# Patient Record
Sex: MALE | Race: WHITE
[De-identification: names, ages, dates, MRNs, and addresses within clinical notes are randomized per-mention and may not be internally consistent; named-entity substitution may affect disease eponyms.]

---

## 2018-03-11 ENCOUNTER — HOSPITAL ENCOUNTER (EMERGENCY)
Dept: HOSPITAL 62 - ER | Age: 38
Discharge: HOME | End: 2018-03-11
Payer: COMMERCIAL

## 2018-03-11 VITALS — DIASTOLIC BLOOD PRESSURE: 113 MMHG | SYSTOLIC BLOOD PRESSURE: 169 MMHG

## 2018-03-11 DIAGNOSIS — R11.2: Primary | ICD-10-CM

## 2018-03-11 DIAGNOSIS — K92.1: ICD-10-CM

## 2018-03-11 DIAGNOSIS — R10.9: ICD-10-CM

## 2018-03-11 DIAGNOSIS — F17.200: ICD-10-CM

## 2018-03-11 LAB
ADD MANUAL DIFF: NO
ALBUMIN SERPL-MCNC: 5.1 G/DL (ref 3.5–5)
ALP SERPL-CCNC: 91 U/L (ref 38–126)
ALT SERPL-CCNC: 67 U/L (ref 21–72)
ANION GAP SERPL CALC-SCNC: 15 MMOL/L (ref 5–19)
AST SERPL-CCNC: 44 U/L (ref 17–59)
BASOPHILS # BLD AUTO: 0.1 10^3/UL (ref 0–0.2)
BASOPHILS NFR BLD AUTO: 0.5 % (ref 0–2)
BILIRUB DIRECT SERPL-MCNC: 0.4 MG/DL (ref 0–0.4)
BILIRUB SERPL-MCNC: 1.2 MG/DL (ref 0.2–1.3)
BUN SERPL-MCNC: 10 MG/DL (ref 7–20)
CALCIUM: 10.4 MG/DL (ref 8.4–10.2)
CHLORIDE SERPL-SCNC: 99 MMOL/L (ref 98–107)
CO2 SERPL-SCNC: 27 MMOL/L (ref 22–30)
EOSINOPHIL # BLD AUTO: 0.1 10^3/UL (ref 0–0.6)
EOSINOPHIL NFR BLD AUTO: 0.4 % (ref 0–6)
ERYTHROCYTE [DISTWIDTH] IN BLOOD BY AUTOMATED COUNT: 12.6 % (ref 11.5–14)
ETHANOL SERPL-MCNC: < 10 MG/DL
GLUCOSE SERPL-MCNC: 118 MG/DL (ref 75–110)
HCT VFR BLD CALC: 50.7 % (ref 37.9–51)
HGB BLD-MCNC: 18 G/DL (ref 13.5–17)
LIPASE SERPL-CCNC: 160.9 U/L (ref 23–300)
LYMPHOCYTES # BLD AUTO: 1.8 10^3/UL (ref 0.5–4.7)
LYMPHOCYTES NFR BLD AUTO: 13.3 % (ref 13–45)
MCH RBC QN AUTO: 31.4 PG (ref 27–33.4)
MCHC RBC AUTO-ENTMCNC: 35.6 G/DL (ref 32–36)
MCV RBC AUTO: 88 FL (ref 80–97)
MONOCYTES # BLD AUTO: 0.8 10^3/UL (ref 0.1–1.4)
MONOCYTES NFR BLD AUTO: 6.2 % (ref 3–13)
NEUTROPHILS # BLD AUTO: 10.6 10^3/UL (ref 1.7–8.2)
NEUTS SEG NFR BLD AUTO: 79.6 % (ref 42–78)
PLATELET # BLD: 385 10^3/UL (ref 150–450)
POTASSIUM SERPL-SCNC: 3.8 MMOL/L (ref 3.6–5)
PROT SERPL-MCNC: 7.8 G/DL (ref 6.3–8.2)
RBC # BLD AUTO: 5.75 10^6/UL (ref 4.35–5.55)
SODIUM SERPL-SCNC: 140.5 MMOL/L (ref 137–145)
TOTAL CELLS COUNTED % (AUTO): 100 %
WBC # BLD AUTO: 13.3 10^3/UL (ref 4–10.5)

## 2018-03-11 PROCEDURE — 99284 EMERGENCY DEPT VISIT MOD MDM: CPT

## 2018-03-11 PROCEDURE — 80307 DRUG TEST PRSMV CHEM ANLYZR: CPT

## 2018-03-11 PROCEDURE — 80053 COMPREHEN METABOLIC PANEL: CPT

## 2018-03-11 PROCEDURE — 83690 ASSAY OF LIPASE: CPT

## 2018-03-11 PROCEDURE — 96361 HYDRATE IV INFUSION ADD-ON: CPT

## 2018-03-11 PROCEDURE — 96374 THER/PROPH/DIAG INJ IV PUSH: CPT

## 2018-03-11 PROCEDURE — 85025 COMPLETE CBC W/AUTO DIFF WBC: CPT

## 2018-03-11 PROCEDURE — S0164 INJECTION PANTROPRAZOLE: HCPCS

## 2018-03-11 PROCEDURE — 83735 ASSAY OF MAGNESIUM: CPT

## 2018-03-11 PROCEDURE — 96375 TX/PRO/DX INJ NEW DRUG ADDON: CPT

## 2018-03-11 PROCEDURE — 36415 COLL VENOUS BLD VENIPUNCTURE: CPT

## 2018-03-11 NOTE — ER DOCUMENT REPORT
ED General





- General


Chief Complaint: Abdominal Pain


Stated Complaint: VOMITING BLOOD, BLOOD IN STOOL


Time Seen by Provider: 03/11/18 09:00


TRAVEL OUTSIDE OF THE [place] IN LAST 30 DAYS: No





- HPI


Patient complains to provider of: Nausea vomiting


Notes: 





Patient presents for nausea vomiting ongoing for approximately 10 days.  

Patient states he had 2 days of bloody stools that stopped 5 days ago now is 

vomiting a slight amount of blood.  Patient states he does drink alcohol 

approximately 3-4 drinks daily.  Patient denies ever having a colonoscopy or 

EGD performed.  Patient states small streaks whenever he does vomit.  Patient 

denies any fevers chills diarrhea.  Patient resting comfortably upon my 

evaluation.  Patient also does admit smoking and slight marijuana use.  No 

recent travel or antibiotics.





- Related Data


Allergies/Adverse Reactions: 


 





No Known Allergies Allergy (Unverified 03/11/18 08:43)


 











Past Medical History





- Social History


Smoking Status: Current Every Day Smoker


Chew tobacco use (# tins/day): No


Frequency of alcohol use: None


Drug Abuse: None


Family History: Reviewed & Not Pertinent


Patient has suicidal ideation: No


Patient has homicidal ideation: No


Renal/ Medical History: Denies: Hx Peritoneal Dialysis





Review of Systems





- Review of Systems


Constitutional: No symptoms reported


EENT: No symptoms reported


Cardiovascular: No symptoms reported


Respiratory: No symptoms reported


Gastrointestinal: Nausea, Vomiting


Genitourinary: No symptoms reported


Male Genitourinary: No symptoms reported


Musculoskeletal: No symptoms reported


Skin: No symptoms reported


Hematologic/Lymphatic: No symptoms reported


Neurological/Psychological: No symptoms reported


-: Yes All other systems reviewed and negative





Physical Exam





- Vital signs


Vitals: 


 











Temp Pulse Resp BP Pulse Ox


 


 97.9 F   97   16   165/109 H  98 


 


 03/11/18 08:49  03/11/18 08:49  03/11/18 08:49  03/11/18 08:49  03/11/18 08:49











Interpretation: Normal





- General


General appearance: Appears well, Alert





- HEENT


Head: Normocephalic, Atraumatic


Eyes: Normal


Pupils: PERRL





- Respiratory


Respiratory status: No respiratory distress


Chest status: Nontender


Breath sounds: Normal


Chest palpation: Normal





- Cardiovascular


Rhythm: Regular


Heart sounds: Normal auscultation


Murmur: No





- Abdominal


Inspection: Normal


Distension: No distension


Bowel sounds: Normal


Tenderness: Nontender


Organomegaly: No organomegaly





- Back


Back: Normal, Nontender





- Extremities


General upper extremity: Normal inspection, Nontender, Normal color, Normal ROM

, Normal temperature


General lower extremity: Normal inspection, Nontender, Normal color, Normal ROM

, Normal temperature, Normal weight bearing.  No: Eliu's sign





- Neurological


Neuro grossly intact: Yes


Cognition: Normal


Orientation: AAOx4


Clark Coma Scale Eye Opening: Spontaneous


Roland Coma Scale Verbal: Oriented


Clark Coma Scale Motor: Obeys Commands


Clark Coma Scale Total: 15


Speech: Normal


Motor strength normal: LUE, RUE, LLE, RLE


Sensory: Normal





- Psychological


Associated symptoms: Normal affect, Normal mood





- Skin


Skin Temperature: Warm


Skin Moisture: Dry


Skin Color: Normal





Course





- Re-evaluation


Re-evalutation: 





03/11/18 15:07


Laboratory studies showed hemoconcentration.  Patient had no vomiting here is 

able tolerate p.o.  More likely underlying gastritis.  Patient was encouraged 

to stop drinking alcohol patient agrees with plan with nausea medication and 

omeprazole.  Patient scheduled for his care discharged home.  The patient 

presents with nausea vomiting without signs of peritonitis or other life-

threatening or serious etiology. The patient appears stable for discharge and 

has been instructed to return immediately if the symptoms worsen in any way, or 

in 8-12hr if not improved for re-evaluation. The patient has been instructed to 

return if the symptoms worsen or change in any way.





- Vital Signs


Vital signs: 


 











Temp Pulse Resp BP Pulse Ox


 


 97.9 F   92   18   169/113 H  98 


 


 03/11/18 08:49  03/11/18 11:34  03/11/18 11:34  03/11/18 11:34  03/11/18 11:34














- Laboratory


Result Diagrams: 


 03/11/18 09:03





 03/11/18 09:03


Laboratory results interpreted by me: 


 











  03/11/18 03/11/18





  09:03 09:03


 


WBC  13.3 H 


 


RBC  5.75 H 


 


Hgb  18.0 H 


 


Seg Neutrophils %  79.6 H 


 


Absolute Neutrophils  10.6 H 


 


Glucose   118 H


 


Calcium   10.4 H


 


Albumin   5.1 H














Discharge





- Discharge


Clinical Impression: 


 Nausea & vomiting





Condition: Good


Disposition: HOME, SELF-CARE


Instructions:  Gastritis ([facility]), Gastroenteritis (adult) ([facility]), Gastroenterology


Additional Instructions: 


Your laboratory results today show signs of dehydration.  More likely have a GI 

virus that is causing nausea vomiting and some inflammation of the stomach 

gastritis causing the small amount of blood to be vomited.  Please abstain from 

any alcohol use for the next few days.  Take medications as prescribed.  Return 

to ER symptoms worsen.  Would recommend following up with her primary care 

physician and/or gastroenterologist


Prescriptions: 


Omeprazole 20 mg PO DAILY #30 capsule.


Ondansetron [Zofran Odt] 4 mg PO Q6 PRN #30 tab.rapdis


 PRN Reason: For Nausea/Vomiting


Promethazine HCl [Phenergan 25 mg Tablet] 25 mg PO Q6 #30 tablet


Referrals: 


REA VILLATORO DO [Primary Care Provider] - Follow up as needed

## 2018-05-12 ENCOUNTER — HOSPITAL ENCOUNTER (EMERGENCY)
Dept: HOSPITAL 62 - ER | Age: 38
Discharge: HOME | End: 2018-05-12
Payer: COMMERCIAL

## 2018-05-12 VITALS — SYSTOLIC BLOOD PRESSURE: 164 MMHG | DIASTOLIC BLOOD PRESSURE: 119 MMHG

## 2018-05-12 DIAGNOSIS — F17.200: ICD-10-CM

## 2018-05-12 DIAGNOSIS — K21.9: Primary | ICD-10-CM

## 2018-05-12 DIAGNOSIS — I10: ICD-10-CM

## 2018-05-12 DIAGNOSIS — R11.2: ICD-10-CM

## 2018-05-12 DIAGNOSIS — Z79.899: ICD-10-CM

## 2018-05-12 DIAGNOSIS — K29.70: ICD-10-CM

## 2018-05-12 LAB
ADD MANUAL DIFF: NO
ALBUMIN SERPL-MCNC: 5.3 G/DL (ref 3.5–5)
ALP SERPL-CCNC: 98 U/L (ref 38–126)
ALT SERPL-CCNC: 56 U/L (ref 21–72)
ANION GAP SERPL CALC-SCNC: 16 MMOL/L (ref 5–19)
AST SERPL-CCNC: 39 U/L (ref 17–59)
BASOPHILS # BLD AUTO: 0.1 10^3/UL (ref 0–0.2)
BASOPHILS NFR BLD AUTO: 0.5 % (ref 0–2)
BILIRUB DIRECT SERPL-MCNC: 0.3 MG/DL (ref 0–0.4)
BILIRUB SERPL-MCNC: 0.9 MG/DL (ref 0.2–1.3)
BUN SERPL-MCNC: 13 MG/DL (ref 7–20)
CALCIUM: 10.5 MG/DL (ref 8.4–10.2)
CHLORIDE SERPL-SCNC: 98 MMOL/L (ref 98–107)
CO2 SERPL-SCNC: 28 MMOL/L (ref 22–30)
EOSINOPHIL # BLD AUTO: 0 10^3/UL (ref 0–0.6)
EOSINOPHIL NFR BLD AUTO: 0.3 % (ref 0–6)
ERYTHROCYTE [DISTWIDTH] IN BLOOD BY AUTOMATED COUNT: 12.8 % (ref 11.5–14)
GLUCOSE SERPL-MCNC: 140 MG/DL (ref 75–110)
HCT VFR BLD CALC: 52 % (ref 37.9–51)
HGB BLD-MCNC: 18.2 G/DL (ref 13.5–17)
LIPASE SERPL-CCNC: 214.1 U/L (ref 23–300)
LYMPHOCYTES # BLD AUTO: 1.4 10^3/UL (ref 0.5–4.7)
LYMPHOCYTES NFR BLD AUTO: 13.1 % (ref 13–45)
MCH RBC QN AUTO: 30.6 PG (ref 27–33.4)
MCHC RBC AUTO-ENTMCNC: 35 G/DL (ref 32–36)
MCV RBC AUTO: 88 FL (ref 80–97)
MONOCYTES # BLD AUTO: 0.6 10^3/UL (ref 0.1–1.4)
MONOCYTES NFR BLD AUTO: 5.5 % (ref 3–13)
NEUTROPHILS # BLD AUTO: 8.5 10^3/UL (ref 1.7–8.2)
NEUTS SEG NFR BLD AUTO: 80.6 % (ref 42–78)
PLATELET # BLD: 367 10^3/UL (ref 150–450)
POTASSIUM SERPL-SCNC: 4 MMOL/L (ref 3.6–5)
PROT SERPL-MCNC: 8 G/DL (ref 6.3–8.2)
RBC # BLD AUTO: 5.94 10^6/UL (ref 4.35–5.55)
SODIUM SERPL-SCNC: 141.5 MMOL/L (ref 137–145)
TOTAL CELLS COUNTED % (AUTO): 100 %
WBC # BLD AUTO: 10.5 10^3/UL (ref 4–10.5)

## 2018-05-12 PROCEDURE — 80053 COMPREHEN METABOLIC PANEL: CPT

## 2018-05-12 PROCEDURE — 96375 TX/PRO/DX INJ NEW DRUG ADDON: CPT

## 2018-05-12 PROCEDURE — 96361 HYDRATE IV INFUSION ADD-ON: CPT

## 2018-05-12 PROCEDURE — 96374 THER/PROPH/DIAG INJ IV PUSH: CPT

## 2018-05-12 PROCEDURE — 85025 COMPLETE CBC W/AUTO DIFF WBC: CPT

## 2018-05-12 PROCEDURE — 99283 EMERGENCY DEPT VISIT LOW MDM: CPT

## 2018-05-12 PROCEDURE — 36415 COLL VENOUS BLD VENIPUNCTURE: CPT

## 2018-05-12 PROCEDURE — 83690 ASSAY OF LIPASE: CPT

## 2018-05-12 NOTE — ER DOCUMENT REPORT
ED General





- General


Chief Complaint: High Blood Pressure


Stated Complaint: BLOOD PRESSURE CONCERNS


Time Seen by Provider: 05/12/18 12:09


Notes: 





Patient is a 37-year-old male presents emergency room with a chief complaint of 

epigastric discomfort and inability to tolerate p.o.  Patient states his been 

going on since yesterday.  He admits to GERD with nausea and vomiting.  Patient 

states that he has had this similarly in the past and came here and was treated 

and discharged home.  Patient follows with [facility] NP Zoran 





Patient states that he takes lisinopril with HCTZ for blood pressure.


He admits that he was previously drinking a sixpack a day of beer but is 

recently started to cut down.


TRAVEL OUTSIDE OF THE [place] IN LAST 30 DAYS: No





- Related Data


Allergies/Adverse Reactions: 


 





No Known Allergies Allergy (Verified 05/12/18 12:04)


 











Past Medical History





- Social History


Smoking Status: Current Every Day Smoker


Chew tobacco use (# tins/day): No


Frequency of alcohol use: Heavy


Drug Abuse: None


Family History: Reviewed & Not Pertinent


Patient has suicidal ideation: No


Patient has homicidal ideation: No





- Past Medical History


Cardiac Medical History: Reports: Hx Hypertension


Renal/ Medical History: Denies: Hx Peritoneal Dialysis





Review of Systems





- Review of Systems


Constitutional: No symptoms reported


Cardiovascular: No symptoms reported


Respiratory: No symptoms reported


Gastrointestinal: See HPI


Genitourinary: No symptoms reported


Musculoskeletal: No symptoms reported


-: Yes All other systems reviewed and negative





Physical Exam





- Vital signs


Vitals: 


 











Temp Pulse Resp BP Pulse Ox


 


 97.6 F   80   20   171/127 H  100 


 


 05/12/18 12:01  05/12/18 12:01  05/12/18 12:01  05/12/18 12:01  05/12/18 12:01














- Notes


Notes: 





PHYSICAL EXAM


GENERAL: Alert, interacts well. 


HEAD: Normocephalic, atraumatic.


EYES: Pupils equal, round, and reactive to light. Extraocular movements intact.


ENT: Oral mucosa moist, tongue midline. 


NECK: Full range of motion. Supple. Trachea midline.


LUNGS: Clear to auscultation bilaterally, no wheezes, rales, or rhonchi. No 

respiratory distress.


HEART: Regular rate and rhythm. No murmurs, gallops, or rubs.


ABDOMEN: Soft,  nondistended, nontender. No guarding, rebound, or rigidity.. 

Bowel sounds present in all 4 quadrants.


EXTREMITIES: Moves all 4 extremities spontaneously. No edema, radial and 

dorsalis pedis pulses 2/4 bilaterally. No cyanosis. 


NEUROLOGICAL: Alert and oriented x4. Normal speech.


PSYCH: Normal affect, normal mood.


SKIN: Warm, dry, normal turgor. No rashes or lesions noted.


   





Course





- Re-evaluation


Re-evalutation: 





05/12/18 15:15


Patient is a 37-year-old male is hemodynamic stable, no acute distress 

afebrile.  Patient's it is complete resolution of his pain after GI cocktail 

that he received up in triage.  Patient denies any nausea or vomiting at this 

time.  Exam does not reveal any concerns for acute abdominal pathology.  

Patient without benign physical exam.  No concerns for pancreatitis, 

cholecystitis, peritonitis, appendicitis, mesenteric ischemia or obstruction.  

Patient to follow-up with primary care.





- Vital Signs


Vital signs: 


 











Temp Pulse Resp BP Pulse Ox


 


 97.6 F   94   18   175/117 H  100 


 


 05/12/18 12:01  05/12/18 13:45  05/12/18 13:45  05/12/18 13:45  05/12/18 13:45














- Laboratory


Result Diagrams: 


 05/12/18 12:37





 05/12/18 12:37


Laboratory results interpreted by me: 


 











  05/12/18 05/12/18





  12:37 12:37


 


RBC  5.94 H 


 


Hgb  18.2 H 


 


Hct  52.0 H 


 


Seg Neutrophils %  80.6 H 


 


Absolute Neutrophils  8.5 H 


 


Glucose   140 H


 


Calcium   10.5 H


 


Albumin   5.3 H














Discharge





- Discharge


Clinical Impression: 


 GERD (gastroesophageal reflux disease), Gastritis





Condition: Good


Disposition: HOME, SELF-CARE


Instructions:  Clear Liquid Diet ([facility])


Additional Instructions: 


Your symptoms appear to be most consistent with stomach or upper intestinal 

irritation.   Please begin taking famotidine 40 mg in the morning and 40 mg at 

night.  This medicine can be purchased directly over-the-counter.  You may also 

take medicine such as Pepto-Bismol or Tums to assist with your pain.  Please 

return to emergency department immediately if you have worsening of your pain, 

shortness of breath, vomiting, become unable to exert yourself due to pain or 

difficulty breathing, you pass out, or have any pain that radiates into your 

arms, jaw, or back. Please also return if you have any additional symptoms that 

are concerning to you.








You can buy Maalox over the counter which will coat your stomach





Prescriptions: 


Omeprazole Magnesium [Prilosec Otc] 20 mg PO DAILY #30 tablet.


Ondansetron [Zofran Odt 4 mg Tablet] 1 - 2 tab PO Q4H PRN #15 tab.rapdis


 PRN Reason: For Nausea/Vomiting


Sucralfate [Carafate 1 gm Tablet] 1 gm PO ACHS #120 tablet


Forms:  Elevated Blood Pressure


Referrals: 


MICKIE CLOUD FNP [Primary Care Provider] - Follow up in 1 week

## 2018-05-12 NOTE — ER DOCUMENT REPORT
ED Medical Screen (RME)





- General


Chief Complaint: High Blood Pressure


Stated Complaint: BLOOD PRESSURE CONCERNS


Time Seen by Provider: 05/12/18 12:09


Notes: 





RAPID MEDICAL EVALUATION DISCLOSURE


I have seen this patient as part of a Rapid Medical Evaluation and, if 

applicable, placed any initially appropriate orders. The patient will be seen 

and fully evaluated, including a full history and physical exam, by a provider (

in Main ED or Fast Track) when a room becomes available.





------------------------------------------------------------------





37-year-old male PMH EtOH abuse (6 pack beer daily for many years) here with 

complaints of nausea vomiting and "my stomach is on fire" for the past 2 days.  

He states he has been unable to keep down any foods liquids or medications.  He 

had the same constellation of symptoms recently and was seen in the emergency 

department.  He was prescribed Zofran ODT which he has been swallowing and "I 

am puking it right back up".  He is not allowing it to dissolve under his 

tongue.  He has not been able to keep down his lisinopril/hydrochlorothiazide 

combination blood pressure medication.  His usual blood pressure is in the 120s 

systolic.  He denies any chest pain shortness of breath headache.  He denies 

any prior history of pancreatitis.





EXAM


Clear to auscultation bilaterally


Regular rate and rhythm


No abdominal TTP on my exam


No peritoneal signs


TRAVEL OUTSIDE OF THE [place] IN LAST 30 DAYS: No





- Related Data


Allergies/Adverse Reactions: 


 





No Known Allergies Allergy (Verified 05/12/18 12:04)


 











Past Medical History





- Social History


Chew tobacco use (# tins/day): No


Frequency of alcohol use: Heavy


Drug Abuse: None





- Past Medical History


Cardiac Medical History: Reports: Hx Hypertension


Renal/ Medical History: Denies: Hx Peritoneal Dialysis





Physical Exam





- Vital signs


Vitals: 





 











Temp Pulse Resp BP Pulse Ox


 


 97.6 F   80   20   171/127 H  100 


 


 05/12/18 12:01  05/12/18 12:01  05/12/18 12:01  05/12/18 12:01  05/12/18 12:01














Course





- Vital Signs


Vital signs: 





 











Temp Pulse Resp BP Pulse Ox


 


 97.6 F   80   20   171/127 H  100 


 


 05/12/18 12:01  05/12/18 12:01  05/12/18 12:01  05/12/18 12:01  05/12/18 12:01

## 2018-06-11 ENCOUNTER — HOSPITAL ENCOUNTER (EMERGENCY)
Dept: HOSPITAL 62 - ER | Age: 38
Discharge: HOME | End: 2018-06-11
Payer: COMMERCIAL

## 2018-06-11 VITALS — SYSTOLIC BLOOD PRESSURE: 117 MMHG | DIASTOLIC BLOOD PRESSURE: 81 MMHG

## 2018-06-11 DIAGNOSIS — Z79.899: ICD-10-CM

## 2018-06-11 DIAGNOSIS — I10: ICD-10-CM

## 2018-06-11 DIAGNOSIS — K21.9: ICD-10-CM

## 2018-06-11 DIAGNOSIS — R11.2: ICD-10-CM

## 2018-06-11 DIAGNOSIS — R07.9: ICD-10-CM

## 2018-06-11 DIAGNOSIS — R10.13: Primary | ICD-10-CM

## 2018-06-11 DIAGNOSIS — F17.210: ICD-10-CM

## 2018-06-11 DIAGNOSIS — R42: ICD-10-CM

## 2018-06-11 DIAGNOSIS — R20.0: ICD-10-CM

## 2018-06-11 LAB
ADD MANUAL DIFF: NO
ALBUMIN SERPL-MCNC: 5 G/DL (ref 3.5–5)
ALP SERPL-CCNC: 80 U/L (ref 38–126)
ALT SERPL-CCNC: 40 U/L (ref 21–72)
ANION GAP SERPL CALC-SCNC: 16 MMOL/L (ref 5–19)
AST SERPL-CCNC: 26 U/L (ref 17–59)
BASOPHILS # BLD AUTO: 0 10^3/UL (ref 0–0.2)
BASOPHILS NFR BLD AUTO: 0.2 % (ref 0–2)
BILIRUB DIRECT SERPL-MCNC: 0.3 MG/DL (ref 0–0.4)
BILIRUB SERPL-MCNC: 1 MG/DL (ref 0.2–1.3)
BUN SERPL-MCNC: 14 MG/DL (ref 7–20)
CALCIUM: 10.3 MG/DL (ref 8.4–10.2)
CHLORIDE SERPL-SCNC: 92 MMOL/L (ref 98–107)
CK MB SERPL-MCNC: 1.1 NG/ML (ref ?–4.55)
CK SERPL-CCNC: 77 U/L (ref 55–170)
CO2 SERPL-SCNC: 27 MMOL/L (ref 22–30)
EOSINOPHIL # BLD AUTO: 0 10^3/UL (ref 0–0.6)
EOSINOPHIL NFR BLD AUTO: 0.2 % (ref 0–6)
ERYTHROCYTE [DISTWIDTH] IN BLOOD BY AUTOMATED COUNT: 12.7 % (ref 11.5–14)
GLUCOSE SERPL-MCNC: 116 MG/DL (ref 75–110)
HCT VFR BLD CALC: 48 % (ref 37.9–51)
HGB BLD-MCNC: 17.2 G/DL (ref 13.5–17)
LYMPHOCYTES # BLD AUTO: 1.8 10^3/UL (ref 0.5–4.7)
LYMPHOCYTES NFR BLD AUTO: 10.8 % (ref 13–45)
MCH RBC QN AUTO: 30.4 PG (ref 27–33.4)
MCHC RBC AUTO-ENTMCNC: 35.8 G/DL (ref 32–36)
MCV RBC AUTO: 85 FL (ref 80–97)
MONOCYTES # BLD AUTO: 0.8 10^3/UL (ref 0.1–1.4)
MONOCYTES NFR BLD AUTO: 4.7 % (ref 3–13)
NEUTROPHILS # BLD AUTO: 14 10^3/UL (ref 1.7–8.2)
NEUTS SEG NFR BLD AUTO: 84.1 % (ref 42–78)
PLATELET # BLD: 381 10^3/UL (ref 150–450)
POTASSIUM SERPL-SCNC: 3.9 MMOL/L (ref 3.6–5)
PROT SERPL-MCNC: 7.4 G/DL (ref 6.3–8.2)
RBC # BLD AUTO: 5.65 10^6/UL (ref 4.35–5.55)
SODIUM SERPL-SCNC: 134.6 MMOL/L (ref 137–145)
TOTAL CELLS COUNTED % (AUTO): 100 %
TROPONIN I SERPL-MCNC: < 0.012 NG/ML
WBC # BLD AUTO: 16.6 10^3/UL (ref 4–10.5)

## 2018-06-11 PROCEDURE — 99284 EMERGENCY DEPT VISIT MOD MDM: CPT

## 2018-06-11 PROCEDURE — 96374 THER/PROPH/DIAG INJ IV PUSH: CPT

## 2018-06-11 PROCEDURE — 82553 CREATINE MB FRACTION: CPT

## 2018-06-11 PROCEDURE — 84484 ASSAY OF TROPONIN QUANT: CPT

## 2018-06-11 PROCEDURE — 74177 CT ABD & PELVIS W/CONTRAST: CPT

## 2018-06-11 PROCEDURE — 80053 COMPREHEN METABOLIC PANEL: CPT

## 2018-06-11 PROCEDURE — 36415 COLL VENOUS BLD VENIPUNCTURE: CPT

## 2018-06-11 PROCEDURE — 93010 ELECTROCARDIOGRAM REPORT: CPT

## 2018-06-11 PROCEDURE — 99406 BEHAV CHNG SMOKING 3-10 MIN: CPT

## 2018-06-11 PROCEDURE — 85025 COMPLETE CBC W/AUTO DIFF WBC: CPT

## 2018-06-11 PROCEDURE — 82550 ASSAY OF CK (CPK): CPT

## 2018-06-11 PROCEDURE — 93005 ELECTROCARDIOGRAM TRACING: CPT

## 2018-06-11 NOTE — ER DOCUMENT REPORT
ED General





- General


Chief Complaint: Nausea/Vomiting


Stated Complaint: BLOOD PRESSURE ISSUE, DIZZY, NAUSEA


Time Seen by Provider: 06/11/18 11:50


Mode of Arrival: Ambulatory


Information source: Patient


Notes: 





37-year-old male history of hypertension who is currently on lisinopril 

presents with complaints of possible high blood pressure.  Patient notes he was 

diagnosed with gastric reflux notes the symptoms worsened over the past few 

weeks, associated with nausea vomiting fevers and chills.  Patient also notes 

that his arm has been numb with intermittent movement and positioning


Patient does note some mild chest pain, does admit to his uncle having an MI 

and dying at 34


TRAVEL OUTSIDE OF THE [place] IN LAST 30 DAYS: No





- HPI


Onset: Other - For the past 4 or 5 months


Onset/Duration: Intermittent


Quality of pain: Pressure


Severity: Mild


Pain Level: 1


Associated symptoms: Nausea, Vomiting


Exacerbated by: Denies


Relieved by: Denies


Similar symptoms previously: Yes


Recently seen / treated by doctor: Yes





- Related Data


Allergies/Adverse Reactions: 


 





No Known Allergies Allergy (Verified 05/12/18 12:04)


 











Past Medical History





- Social History


Smoking Status: Current Every Day Smoker


Cigarette use (# per day): Yes


Chew tobacco use (# tins/day): No


Smoking Education Provided: Yes - Patient counselled regarding cessation for 4 

minutes


Frequency of alcohol use: Rare


Drug Abuse: Marijuana


Family History: Reviewed & Not Pertinent


Patient has suicidal ideation: No


Patient has homicidal ideation: No





- Past Medical History


Cardiac Medical History: Reports: Hx Hypertension


Renal/ Medical History: Denies: Hx Peritoneal Dialysis





Review of Systems





- Review of Systems


Notes: 





REVIEW OF SYSTEMS:


CONSTITUTIONAL :  Denies fever,  chills, or sweats.  Denies recent illness.


EENT:   Denies eye, ear, throat, or mouth pain or symptoms.  Denies nasal or 

sinus congestion or discharge.  Denies throat, tongue, or mouth swelling or 

difficulty swallowing.


CARDIOVASCULAR: Admits to intermittent chest pain


RESPIRATORY:  Denies cough, cold, or chest congestion.  Denies shortness of 

breath, difficulty breathing, or wheezing.


GASTROINTESTINAL: Admits to nausea


GENITOURINARY:  Denies difficulty urinating, painful urination, burning, 

frequency, blood in urine, or discharge.


MUSCULOSKELETAL:  Denies back or neck pain or stiffness.  Denies joint pain or 

swelling.


SKIN:   Denies rash, lesions or sores.


HEMATOLOGIC :   Denies easy bruising or bleeding.


LYMPHATIC:  Denies swollen, enlarged glands.


NEUROLOGICAL:  Denies confusion or altered mental status.  Denies passing out 

or loss of consciousness.  Denies dizziness or lightheadedness.  Denies 

headache.  Denies weakness or paralysis or loss of use of either side.  Denies 

problems with gait or speech.  Denies sensory loss, numbness, or tingling.  

Denies seizures.


PSYCHIATRIC:  Denies anxiety or stress.  Denies depression, suicidal ideation, 

or homicidal ideation.





ALL OTHER SYSTEMS REVIEWED AND NEGATIVE.





Dictation was performed using Dragon voice recognition software 





PHYSICAL EXAMINATION:





GENERAL: Well-appearing, well-nourished and in no acute distress.





HEAD: Atraumatic, normocephalic.





EYES: Pupils equal round and reactive to light, extraocular movements intact, 

sclera anicteric, conjunctiva are normal.





ENT: Nares patent, oropharynx clear without exudates.  Moist mucous membranes.





NECK: Normal range of motion, supple without lymphadenopathy





LUNGS: Breath sounds clear to auscultation bilaterally and equal.  No wheezes 

rales or rhonchi.





HEART: Regular rate and rhythm without murmurs





ABDOMEN: Soft, tender in the epigastric region left upper quadrant right upper 

quadrant





Musculoskeletal: Normal range of motion, no pitting or edema.  No cyanosis.





NEUROLOGICAL: Cranial nerves grossly intact.  Normal speech, normal gait.  

Normal sensory, motor exams 





PSYCH: Normal mood, normal affect.





SKIN: Warm, Dry, normal turgor, no rashes or lesions noted.





Physical Exam





- Vital signs


Vitals: 


 











Temp Pulse Resp BP Pulse Ox


 


 97.9 F   70   20   146/103 H  99 


 


 06/11/18 10:49  06/11/18 10:49  06/11/18 10:49  06/11/18 10:49  06/11/18 10:49














Course





- Re-evaluation


Re-evalutation: 





06/11/18 12:00


I do not believe this is cardiac at all, nonetheless workup is pending lab work 

imaging ordered I believe this is more abdominal GERD related


06/11/18 15:51


Patient has mild white count elevation, I believe this is from vomiting, CT 

noted no significant abnormality patient will be treated symptomatically 

otherwise well-appearing no distress does not appear to be cardiac in nature








After performing a Medical Screening Examination, I estimate there is LOW risk 

for ACUTE APPENDICITIS, BOWEL OBSTRUCTION, ACUTE CHOLECYSTITIS, PERFORATED 

DIVERTICULITIS, INCARCERATED HERNIA, PANCREATITIS, TESTICULAR TORSION or 

PERFORATED ULCER, thus I consider the discharge disposition reasonable. Also, 

there is no evidence or peritonitis, sepsis, or toxicity.  I have reevaluated 

this patient multiple times and no significant life threatening changes are 

noted. The patient and I have discussed the diagnosis and risks, and we agree 

with discharging home with close follow-up with the understanding that symptoms 

and presentations can change. We also discussed returning to the Emergency 

Department immediately if new or worsening symptoms occur. We have discussed 

the symptoms which are most concerning (e.g., bloody stool, fever, changing or 

worsening pain, intractable vomiting - standard verbal up date) that 

necessitate immediate return.





- Vital Signs


Vital signs: 


 











Temp Pulse Resp BP Pulse Ox


 


 97.9 F   70   20   146/103 H  99 


 


 06/11/18 10:49  06/11/18 10:49  06/11/18 10:49  06/11/18 10:49  06/11/18 10:49














- Laboratory


Result Diagrams: 


 06/11/18 13:14





 06/11/18 13:14


Laboratory results interpreted by me: 


 











  06/11/18 06/11/18





  13:14 13:14


 


WBC  16.6 H 


 


RBC  5.65 H 


 


Hgb  17.2 H 


 


Seg Neutrophils %  84.1 H 


 


Lymphocytes %  10.8 L 


 


Absolute Neutrophils  14.0 H 


 


Sodium   134.6 L


 


Chloride   92 L


 


Glucose   116 H


 


Calcium   10.3 H














- Diagnostic Test


Radiology reviewed: Image reviewed, Reports reviewed - CT abdomen with IV 

contrast notes no significant murmur





Discharge





- Discharge


Clinical Impression: 


 Epigastric pain





Condition: Stable


Disposition: HOME, SELF-CARE


Instructions:  Abdominal Pain ([facility])


Prescriptions: 


Famotidine [Pepcid 20 mg Tablet] 20 mg PO DAILY #30 tablet


Referrals: 


MICKIE CLOUD FNP [Primary Care Provider] - Follow up as needed


ARIANA STEINBERG MD [ACTIVE STAFF] - Follow up tomorrow

## 2018-06-11 NOTE — RADIOLOGY REPORT (SQ)
EXAM DESCRIPTION:  CT ABD/PELVIS WITH IV ONLY



COMPLETED DATE/TIME:  6/11/2018 3:18 pm



REASON FOR STUDY:  abd pain



COMPARISON:  None.



TECHNIQUE:  CT scan of the abdomen and pelvis performed using helical scanning technique with dynamic
 intravenous contrast injection.  No oral contrast. Images reviewed with lung, soft tissue, and bone 
windows. Reconstructed coronal and sagittal MPR images reviewed. Delayed images for evaluation of the
 urinary system also acquired. All images stored on PACS.

All CT scanners at this facility use dose modulation, iterative reconstruction, and/or weight based d
osing when appropriate to reduce radiation dose to as low as reasonably achievable (ALARA).

[facility]: Dose Right  [facility]: CareDose    [facility]: Dose Right    [facility]: Teradose 4D    [facility]: [employer] Technologies



CONTRAST TYPE AND DOSE:  contrast/concentration: Isovue 370.00 mg/ml; Total Contrast Delivered: 70.0 
ml; Total Saline Delivered: 59.0 ml



RENAL FUNCTION:  Creatinine- 0.76

BUN=13



RADIATION DOSE:  CT Rad equipment meets quality standard of care and radiation dose reduction techniq
ues were employed. CTDIvol: 7.5 - 8.6 mGy. DLP: 811 mGy-cm..



LIMITATIONS:  None.



FINDINGS:  LOWER CHEST: No significant findings. No nodules or infiltrates.  The hepatic and portal v
eins are patent.

LIVER: Normal size. No masses.  No dilated ducts.

SPLEEN: Normal size. No focal lesions.

PANCREAS: No masses. No significant calcifications. No adjacent inflammation or peripancreatic fluid 
collections. Pancreatic duct not dilated.

GALLBLADDER: No identified stones by CT criteria. No inflammatory changes to suggest cholecystitis.

ADRENAL GLANDS: No significant masses or asymmetry.

RIGHT KIDNEY AND URETER:  Extrarenal pelvis on the right, normal anatomic variant.  No solid masses. 
  No significant calcifications.   No hydronephrosis or hydroureter.

LEFT KIDNEY AND URETER: No solid masses.   No significant calcifications.  Extrarenal pelvis on the l
eft, normal anatomic variant.   No hydronephrosis or hydroureter.

AORTA AND VESSELS: No aneurysm. No dissection. Renal arteries, SMA, celiac without stenosis.

RETROPERITONEUM: No retroperitoneal adenopathy, hemorrhage or masses.

BOWEL AND PERITONEAL CAVITY: No masses or inflammatory changes. No free fluid or peritoneal masses.

APPENDIX: Normal.

PELVIS:  Prostate gland measures 4.0 cm in diameter.  No mass.  No free fluid. Normal bladder.

ABDOMINAL WALL: No masses. No hernias.

BONES: No significant or acute findings.

OTHER: No other significant finding.



IMPRESSION:  NO SIGNIFICANT OR ACUTE FINDING IN THE ABDOMEN OR PELVIS ON CT SCAN WITH IV CONTRAST.



TECHNICAL DOCUMENTATION:  JOB ID:  [id number]

Quality ID # [id number]: Final reports with documentation of one or more dose reduction techniques (e.g., Au
tomated exposure control, adjustment of the mA and/or kV according to patient size, use of iterative 
reconstruction technique)

 2011 [employer]- All Rights Reserved



Reading location - IP/workstation name: [email or web address]

## 2019-04-10 ENCOUNTER — HOSPITAL ENCOUNTER (EMERGENCY)
Dept: HOSPITAL 62 - ER | Age: 39
Discharge: LEFT BEFORE DISCHARGE | End: 2019-04-10
Payer: COMMERCIAL

## 2019-04-10 VITALS — SYSTOLIC BLOOD PRESSURE: 158 MMHG | DIASTOLIC BLOOD PRESSURE: 96 MMHG

## 2019-04-10 DIAGNOSIS — R39.89: ICD-10-CM

## 2019-04-10 DIAGNOSIS — K92.0: Primary | ICD-10-CM

## 2019-04-10 DIAGNOSIS — R55: ICD-10-CM

## 2019-04-10 DIAGNOSIS — Z88.5: ICD-10-CM

## 2019-04-10 DIAGNOSIS — Z53.20: ICD-10-CM

## 2019-04-10 DIAGNOSIS — I10: ICD-10-CM

## 2019-04-10 PROCEDURE — 99281 EMR DPT VST MAYX REQ PHY/QHP: CPT

## 2019-04-10 NOTE — ER DOCUMENT REPORT
ED Medical Screen (RME)





- General


Chief Complaint: Vomiting


Stated Complaint: VOMITING, LEFT SIDE PAIN


Time Seen by Provider: 04/10/19 15:57


Primary Care Provider: 


MEGHANA KOTHARI PA [Primary Care Provider] - Follow up as needed


Mode of Arrival: Ambulatory


Information source: Patient


Notes: 





Patient presents emergency department with complaints of vomiting for the past 5

days.  Reports he is passed out coming out of the shower a few times.  Also 

reports he is vomiting up blood now.  Reports history of this for a while.  He 

has been evaluated by GI in [place].  He has been told to quit drinking EtOH 

and quit smoking marijuana.  He reports he has quit smoking marijuana and does 

not drink as much.  Reports decreased urination.  Last bowel movement was Friday

and was normal.











I have greeted and performed a rapid initial assessment of this patient.  A 

comprehensive ED assessment and evaluation of the patient, analysis of test 

results and completion of the medical decision making process will be conducted 

by additional ED providers.








Dictation of this chart was performed using voice recognition software; 

therefore, there may be some unintended grammatical errors.


TRAVEL OUTSIDE OF THE [place] IN LAST 30 DAYS: No





- Related Data


Allergies/Adverse Reactions: 


                                        





hydromorphone [From Dilaudid] Allergy (Verified 04/10/19 15:49)


   











Past Medical History





- Social History


Chew tobacco use (# tins/day): No


Frequency of alcohol use: every other day





- Past Medical History


Cardiac Medical History: Reports: Hx Hypertension


Renal/ Medical History: Denies: Hx Peritoneal Dialysis





Physical Exam





- Vital signs


Vitals: 





                                        











Temp Pulse Resp BP Pulse Ox


 


 98.1 F   72   18   158/96 H  98 


 


 04/10/19 14:58  04/10/19 14:58  04/10/19 14:58  04/10/19 14:58  04/10/19 14:58














Course





- Vital Signs


Vital signs: 





                                        











Temp Pulse Resp BP Pulse Ox


 


 98.1 F   72   18   158/96 H  98 


 


 04/10/19 14:58  04/10/19 14:58  04/10/19 14:58  04/10/19 14:58  04/10/19 14:58














Doctor's Discharge





- Discharge


Referrals: 


MEGHANA KOTHARI PA [Primary Care Provider] - Follow up as needed